# Patient Record
Sex: MALE | Race: WHITE | NOT HISPANIC OR LATINO | Employment: OTHER | URBAN - METROPOLITAN AREA
[De-identification: names, ages, dates, MRNs, and addresses within clinical notes are randomized per-mention and may not be internally consistent; named-entity substitution may affect disease eponyms.]

---

## 2023-10-09 PROBLEM — M77.8 RIGHT SHOULDER TENDINITIS: Status: ACTIVE | Noted: 2023-10-09

## 2023-10-09 PROBLEM — M54.12 RIGHT CERVICAL RADICULOPATHY: Status: ACTIVE | Noted: 2023-10-09

## 2023-10-09 PROBLEM — G89.29 CHRONIC BILATERAL LOW BACK PAIN WITHOUT SCIATICA: Status: ACTIVE | Noted: 2023-10-09

## 2023-10-09 PROBLEM — M54.50 CHRONIC BILATERAL LOW BACK PAIN WITHOUT SCIATICA: Status: ACTIVE | Noted: 2023-10-09

## 2023-10-09 PROBLEM — G56.03 CARPAL TUNNEL SYNDROME ON BOTH SIDES: Status: ACTIVE | Noted: 2023-10-09

## 2023-10-09 PROBLEM — S83.242A OTH TEAR OF MEDIAL MENISCUS, CURRENT INJURY, LEFT KNEE, INIT: Status: ACTIVE | Noted: 2023-10-09

## 2023-10-09 PROBLEM — M54.12 LEFT CERVICAL RADICULOPATHY: Status: ACTIVE | Noted: 2023-10-09

## 2023-10-09 PROBLEM — M17.12 LOCALIZED OSTEOARTHRITIS OF LEFT KNEE: Status: ACTIVE | Noted: 2023-10-09

## 2023-10-09 PROBLEM — M50.20 DISC DISPLACEMENT, CERVICAL: Status: ACTIVE | Noted: 2023-10-09

## 2023-10-09 RX ORDER — SUMATRIPTAN SUCCINATE 100 MG/1
100 TABLET ORAL ONCE AS NEEDED
COMMUNITY
Start: 2016-02-09

## 2023-10-09 RX ORDER — TIZANIDINE 4 MG/1
1 TABLET ORAL 2 TIMES DAILY
COMMUNITY
Start: 2019-10-02 | End: 2024-01-18 | Stop reason: SDUPTHER

## 2023-10-10 ENCOUNTER — PROCEDURE VISIT (OUTPATIENT)
Dept: PAIN MEDICINE | Facility: CLINIC | Age: 63
End: 2023-10-10
Payer: COMMERCIAL

## 2023-10-10 DIAGNOSIS — G89.29 CHRONIC BILATERAL LOW BACK PAIN WITHOUT SCIATICA: Primary | ICD-10-CM

## 2023-10-10 DIAGNOSIS — M54.50 CHRONIC BILATERAL LOW BACK PAIN WITHOUT SCIATICA: Primary | ICD-10-CM

## 2023-10-10 PROCEDURE — 97814 ACUP 1/> W/ESTIM EA ADDL 15: CPT | Performed by: PHYSICAL MEDICINE & REHABILITATION

## 2023-10-10 PROCEDURE — 97813 ACUP 1/> W/ESTIM 1ST 15 MIN: CPT | Performed by: PHYSICAL MEDICINE & REHABILITATION

## 2023-10-10 NOTE — PROGRESS NOTES
Chief complaint  Lower back pain    History  Mr. Wilson is back today for his acupuncture.  He had 1 treatment session and could not follow-up this is for his lower back pain  Per his insurance he is allowed 12 visits within 90 days/ No pa required  For chronic LBP  He has a low back pain across the lower back area deep achy stabbing.  The pain is worse with forward flexion and bending.  He had x-rays and showed multiple level degenerative changes but there is no compressive pathology.  There is no overt radicular symptoms to the lower limbs.    Physical examination  declined Chaperone for the visit and was adequately  draped for the exam.  He had tight muscle bands in the lower back area.  Of note that he also have neck pain associated with limited range of motion as well    Diagnosis  Lumbar degenerative disc disease chronic low back pain    Plan  I went over the mechanism of action of the acupuncture and the process of releasing endorphins to help with the pain in the lower back.  He had tried other modalities with physical therapy and home exercise program.  He had a TENS unit in the past and those did not help.  He wanted to proceed again today with acupuncture.  He had prior treatment that he had some relief he wanted to continue.    We discussed the risks and benefits of the injections including but not limited to nerve injury, infection, bleeding, headaches and paralysis and remotely death. The patient agreed to proceed, will perform the procedure with sterile techniques. Declined Chaperone for the procedure.     Placed in prone position.  I started with Michael Men treatment with 8 Hz.  That was kept for the rest of the treatment but increased to 10 Hz after 15 minutes  At 15 minutes I added bladder kidney circulation from BL 40 to Bl 57 balanced by Ki 3 bilaterally this was done at 6 Hz and later increased to 8 Hz  At 30 minutes I agitans module and two needles  technique to the lower back area and iron  those at 6 hz    At 45 minutes I removed all needles from top to bottom  Treatment started at 1:40 PM and concluded at 2:30 PM next procedure clinical very well no complication encounter  Avoid heavy exercise and heavy meals.    Avoid any other intense activity for the next 24 hours.    You might experience transient aggravation of the symptoms being treated with acupuncture resulting from the treatment today.  This will only be transient for few hours but should improve.    You may experience tiredness and fatigability after the acupuncture treatment today which may last up to 24 hours.    You should start feeling improvement of the symptoms treated for within the next several hours or by the next 2 or 3 days.    We may need to repeat therapy with acupuncture to improve the pain control

## 2023-10-14 PROBLEM — M51.369 LUMBAR DEGENERATIVE DISC DISEASE: Status: ACTIVE | Noted: 2023-10-14

## 2023-10-14 PROBLEM — M51.36 LUMBAR DEGENERATIVE DISC DISEASE: Status: ACTIVE | Noted: 2023-10-14

## 2023-10-14 NOTE — PATIENT INSTRUCTIONS
Avoid heavy exercise and heavy meals.    Avoid any other intense activity for the next 24 hours.    You might experience transient aggravation of the symptoms being treated with acupuncture resulting from the treatment today.  This will only be transient for few hours but should improve.    You may experience tiredness and fatigability after the acupuncture treatment today which may last up to 24 hours.    You should start feeling improvement of the symptoms treated for within the next several hours or by the next 2 or 3 days.    We may need to repeat therapy with acupuncture to improve the pain control

## 2023-10-19 ENCOUNTER — PROCEDURE VISIT (OUTPATIENT)
Dept: PAIN MEDICINE | Facility: CLINIC | Age: 63
End: 2023-10-19
Payer: COMMERCIAL

## 2023-10-19 DIAGNOSIS — G89.29 CHRONIC BILATERAL LOW BACK PAIN WITHOUT SCIATICA: Primary | ICD-10-CM

## 2023-10-19 DIAGNOSIS — M54.50 CHRONIC BILATERAL LOW BACK PAIN WITHOUT SCIATICA: Primary | ICD-10-CM

## 2023-10-19 PROCEDURE — 97814 ACUP 1/> W/ESTIM EA ADDL 15: CPT | Performed by: PHYSICAL MEDICINE & REHABILITATION

## 2023-10-19 PROCEDURE — 97813 ACUP 1/> W/ESTIM 1ST 15 MIN: CPT | Performed by: PHYSICAL MEDICINE & REHABILITATION

## 2023-10-19 NOTE — PROGRESS NOTES
Chief complaint  Lower back pain presenting for acupuncture     History  Mr. Wilson is back today for his acupuncture.    this is for his lower back pain , this is the 3 treatment . He is allowed per insurance to have 12 visits within 90 days/ No pa required  For chronic LBP  He starting to get more limber with the acupuncture and feeling relief   He has a low back pain across the lower back area deep achy stabbing.  The pain is worse with forward flexion and bending.  He had x-rays and showed multiple level degenerative changes but there is no compressive pathology.  There is no overt radicular symptoms to the lower limbs.     Physical examination  declined Chaperone for the visit and was adequately  draped for the exam.  He had tight muscle bands in the lower back area.  Of note that he also have neck pain associated with limited range of motion as well     Diagnosis  Lumbar degenerative disc disease chronic low back pain     Plan  I went over the mechanism of action of the acupuncture and the process of releasing endorphins to help with the pain in the lower back.  He had tried other modalities with physical therapy and home exercise program.  He had a TENS unit in the past and those did not help.  He wanted to proceed again today with acupuncture.  He had prior treatment that he had some relief he wanted to continue.     We discussed the risks and benefits of the injections including but not limited to nerve injury, infection, bleeding, headaches and paralysis and remotely death. The patient agreed to proceed, will perform the procedure with sterile techniques. Declined Chaperone for the procedure.     Placed in prone position.  I started with Michael Men treatment with 6 Hz.  That was kept for the rest of the treatment but increased to 8 Hz after 15 minutes  At 15 minutes I added bladder kidney circulation from BL 40 to Bl 57 balanced by Ki 3 bilaterally this was done at 6 Hz and later increased to 8 Hz  At 30  minutes I added a PENS module and two needles  technique to the lower back area and iron those at 6 hz  I also added Li4 to help with anxiety    I placed auricular points for the lower L spine for the duration of the treatment     At 45 minutes I removed all needles from top to bottom  Treatment started at 1:40 PM and concluded at 2:30 PM next procedure clinical very well no complication encountered

## 2023-11-02 ENCOUNTER — APPOINTMENT (OUTPATIENT)
Dept: PAIN MEDICINE | Facility: CLINIC | Age: 63
End: 2023-11-02
Payer: COMMERCIAL

## 2023-11-09 ENCOUNTER — PROCEDURE VISIT (OUTPATIENT)
Dept: PAIN MEDICINE | Facility: CLINIC | Age: 63
End: 2023-11-09
Payer: COMMERCIAL

## 2023-11-09 DIAGNOSIS — G89.29 CHRONIC BILATERAL LOW BACK PAIN WITHOUT SCIATICA: Primary | ICD-10-CM

## 2023-11-09 DIAGNOSIS — M54.50 CHRONIC BILATERAL LOW BACK PAIN WITHOUT SCIATICA: Primary | ICD-10-CM

## 2023-11-09 DIAGNOSIS — M51.36 LUMBAR DEGENERATIVE DISC DISEASE: ICD-10-CM

## 2023-11-09 PROCEDURE — 97813 ACUP 1/> W/ESTIM 1ST 15 MIN: CPT | Performed by: PHYSICAL MEDICINE & REHABILITATION

## 2023-11-09 PROCEDURE — 97814 ACUP 1/> W/ESTIM EA ADDL 15: CPT | Performed by: PHYSICAL MEDICINE & REHABILITATION

## 2023-11-09 NOTE — PROGRESS NOTES
Chief complaint  Lower back pain    History  Presenting for acupuncture    this is for his lower back pain , this is the 4tgh treatment . He is allowed per insurance to have 12 visits within 90 days/ No pa required  For chronic LBP     Diagnosis  Problem List Items Addressed This Visit       Chronic bilateral low back pain without sciatica - Primary    Lumbar degenerative disc disease         Plan  I went over the mechanism of action of the acupuncture and the process of releasing endorphins to help with the pain in the lower back.  He had tried other modalities with physical therapy and home exercise program.           Placed in prone position.  I started with Michael Men treatment with 6 Hz.  That was kept for the rest of the treatment but increased to 8 Hz after 15 minutes  At 15 minutes I added bladder kidney circulation from BL 40 to Bl 57 balanced by Ki 3 bilaterally this was done at 6 Hz and later increased to 6 Hz  At 30 minutes I added a PENS module and two needles  technique to the lower back area and iron those at 4 hz  I also added Li4 to help with anxiety     I placed auricular points for the lower L spine for the duration of the treatment     At 45 minutes I removed all needles from top to bottom  Treatment started at 10 AM and concluded at 10:50 AM next procedure clinical very well no complications encountered     Follow-up next week for the next treatment

## 2023-11-16 ENCOUNTER — APPOINTMENT (OUTPATIENT)
Dept: PAIN MEDICINE | Facility: CLINIC | Age: 63
End: 2023-11-16
Payer: COMMERCIAL

## 2024-01-18 ENCOUNTER — OFFICE VISIT (OUTPATIENT)
Dept: PAIN MEDICINE | Facility: CLINIC | Age: 64
End: 2024-01-18
Payer: COMMERCIAL

## 2024-01-18 DIAGNOSIS — M54.50 CHRONIC BILATERAL LOW BACK PAIN WITHOUT SCIATICA: Primary | ICD-10-CM

## 2024-01-18 DIAGNOSIS — G89.29 CHRONIC BILATERAL LOW BACK PAIN WITHOUT SCIATICA: Primary | ICD-10-CM

## 2024-01-18 DIAGNOSIS — M51.36 LUMBAR DEGENERATIVE DISC DISEASE: ICD-10-CM

## 2024-01-18 DIAGNOSIS — M50.20 DISC DISPLACEMENT, CERVICAL: ICD-10-CM

## 2024-01-18 PROCEDURE — 99214 OFFICE O/P EST MOD 30 MIN: CPT | Performed by: PHYSICAL MEDICINE & REHABILITATION

## 2024-01-18 RX ORDER — TIZANIDINE 4 MG/1
4 TABLET ORAL 2 TIMES DAILY
Qty: 60 TABLET | Refills: 1 | Status: SHIPPED | OUTPATIENT
Start: 2024-01-18 | End: 2024-04-18 | Stop reason: SDUPTHER

## 2024-01-18 NOTE — PROGRESS NOTES
Chief complaint  Back and neck pain    History  Mr Wilson is back for a visit  Acup helped some  Continue with back pain and neck pain   The pain in the back is deep achy worse in the mid back area.  This is associated with tight muscle bands.  This limits the range of motion of the lumbar spine mainly in forward flexion.  The pain in the back is radiating around the side on the lateral aspect of the   thighs going down toward the lateral aspect of the legs into the lateral malleosli toward the lateral aspect of the feet towards the big toes.       The neck pain is of similar caracteristics     Pain level without medication is 8/10 , with the medication pain level 6/10.     The pain meds are helping control the pain and improving Activities of Daily living and quality of life and quality of sleep.       PDI (Pain Disability Index) score: 74  Oarrs pulled and scanned in the chart  no concerns     Xray updated spine   ORT Score is  0  Pain pathology and pain generators spine   Modalities tried injection, surgery, physical therapy, TENS unit, nonsteroidal anti-inflammatory medication  acup injection      Denied any fever or chills. No weight loss and no night sweats. No cough or sputum production. No diarrhea   The constipation has been responding to fibers and over the counter medications.     No bladder and bowel incontinence and no other changes in bladder and bowel. No skin changes.  Reports tiredness and fatigability only if the pain is not controlled.   Denied opioids diversion and abuse and denies alcoholism. Denies overuse of the pain medications.    The control of the pain with the pain medications is helping the control of the symptoms and allowing the function and activities of daily living, enjoyment of life, improving the quality of life and sleep with less interruption by the pain. The goal is symptomatic control of the nonmalignant chronic pain and not to repair the permanent damage in the tissues  inducing the chronic pain conditions. We are aiming to shift the focus from the nonmalignant chronic pain to other aspects of life by symptomatically treating this chronic pain. If this pain is not treated it will lead to major morbidity and it is also associated with increased risks of mortality. The patient understands those very clearly and also understand high risks of morbidity and mortality if not strictly adherent to the treatment recommendations and reporting any associated side effects. Also patient understand the full responsibility associated with these medications to avoid abuse or overuse or any use of these medications for anything besides treating the patient's own chronic pain and nothing else under any circumstances.        Physical examination  Awake, alert and oriented for time place and persons   declined Chaperone for the visit and was adequately  draped for the exam.  Tight ROM of spine with tight muscle bands    Diagnosis  Problem List Items Addressed This Visit       Disc displacement, cervical    Relevant Medications    tiZANidine (Zanaflex) 4 mg tablet    Chronic bilateral low back pain without sciatica - Primary    Relevant Medications    tiZANidine (Zanaflex) 4 mg tablet    Lumbar degenerative disc disease    Relevant Medications    tiZANidine (Zanaflex) 4 mg tablet        Plan  Reviewed the pain generators.  Went over the types of pain with neuropathic and nociceptive and different pathologies and therapeutic modalities. Discussed the mechanism of action of interventions from acupuncture, physical therapy , regular exercises, injections, botox, spinal cord stimulation, and role of surgery     Went over pathology of the intervertebral disc displacement and the anatomical relation to the Nerve roots and relation to the radicular symptoms. Went over treatment modalities with conservative treatment including acupuncture   and epidural steroid injection with fluoroscopy guidance and last  resort of surgery       Discussed about NSAIDS and I explained about the opioids sparing effect to allow keeping the opioids dose at minimal effective dose.   I went over the potential side effects of the NSAIDS on the gastrointestinal, renal and cardiovascular systems.        Tizanidine 4 mg bid    Follow-up 8 weeks or earlier if needed     The level of clinical decision making in this office visit,  is high, given the high risks of complications with the morbidity and mortality due to the fact that acute and chronic pain may pose a threat to life and bodily function, if under treated, poorly treated, or with failure to maintain adequate treatment and timely medical follow up. Additionally over treatment has its own set of complications including overdosing on the pain medications and also the habit forming potentials with the use of the medications used to treat chronic painful conditions including therapeutic classes classified as dangerous medications. Given the serious and fluctuating nature of pain level and instensity with extensive consideration for whenever pain changes, there is always the risk of prolonged functional impairment requiring close patient monitoring with regular assessments and reassessments and high level medical decision making at every office visit. The amount and complexity of data reviewed is high given the patient clinical presentation, labs,  data, radiology reports, and other tests as discussed during office visits. Pertinent data whether positive or negative were taken in consideration in the process of making this high level medical decision.

## 2024-04-11 ENCOUNTER — APPOINTMENT (OUTPATIENT)
Dept: PAIN MEDICINE | Facility: CLINIC | Age: 64
End: 2024-04-11
Payer: COMMERCIAL

## 2024-04-18 ENCOUNTER — OFFICE VISIT (OUTPATIENT)
Dept: PAIN MEDICINE | Facility: CLINIC | Age: 64
End: 2024-04-18
Payer: COMMERCIAL

## 2024-04-18 DIAGNOSIS — M17.12 LOCALIZED OSTEOARTHRITIS OF LEFT KNEE: ICD-10-CM

## 2024-04-18 DIAGNOSIS — M54.50 CHRONIC BILATERAL LOW BACK PAIN WITHOUT SCIATICA: ICD-10-CM

## 2024-04-18 DIAGNOSIS — M54.12 LEFT CERVICAL RADICULOPATHY: ICD-10-CM

## 2024-04-18 DIAGNOSIS — M51.36 LUMBAR DEGENERATIVE DISC DISEASE: Primary | ICD-10-CM

## 2024-04-18 DIAGNOSIS — M50.20 DISC DISPLACEMENT, CERVICAL: ICD-10-CM

## 2024-04-18 DIAGNOSIS — G89.29 CHRONIC BILATERAL LOW BACK PAIN WITHOUT SCIATICA: ICD-10-CM

## 2024-04-18 PROCEDURE — 99214 OFFICE O/P EST MOD 30 MIN: CPT | Performed by: PHYSICAL MEDICINE & REHABILITATION

## 2024-04-18 RX ORDER — TIZANIDINE 4 MG/1
4 TABLET ORAL 2 TIMES DAILY
Qty: 60 TABLET | Refills: 2 | Status: SHIPPED | OUTPATIENT
Start: 2024-04-18 | End: 2024-05-18

## 2024-04-18 NOTE — PROGRESS NOTES
Chief complaint  Neck and left upper limb  Back pain   Left knee pain     History  Dax Wilson is back for pain management office visit  The neck and shoulder are better  Back pain on and off  Worst pain is in the left knee  The pain in the left  knee is deep achy stabbing.  It is both on the medial and lateral aspects and behind the kneecap.  The knee pain is associated with sensation of crunching upon range of motion and weightbearing.  The pain is continuous at rest associated with stiffness with prolonged sitting and get worse with standing walking or any weightbearing activity.  Occasionally there is knee swelling.  No change in color or texture of the skin.  No pain proximal to the thigh or distal to the leg associated with the knee pain.  With episodes of aggravation of the pain there are occasion of sensation of instability but no falls.       Pain level at rest    is 3/10 , with the aggravation     pain level 5/10.     Review of Systems     Denied any fever or chills. No weight loss and no night sweats. No cough or sputum production. No diarrhea   The constipation has been responding to fibers and over the counter medications.     No bladder and bowel incontinence and no other changes in bladder and bowel. No skin changes.  Reports tiredness and fatigability only if the pain is not controlled.   Denied opioids diversion and abuse and denies alcoholism. Denies overuse of the pain medications.         Physical examination  Awake, alert and oriented for time place and persons   declined Chaperone for the visit and was adequately  draped for the exam.    Examination of the left knee showed mild clinical effusion. Normal skin color and texture palpation of the knee showed tenderness over the medial and lateral joint line and the sensation of crepitation upon range of motion.  Range of motion from -2 degrees to 105 degrees. No medial lateral instability. No drawer sign.  Lachman is negative.  Positive  Floresita both medially and laterally.  Negative pivot shift.  Homans' sign is negative.  Calves are soft.  Knee flexion and extension is 4/5 and limited by the pain.     Diagnosis  Problem List Items Addressed This Visit       Disc displacement, cervical    Relevant Medications    tiZANidine (Zanaflex) 4 mg tablet    Left cervical radiculopathy    Localized osteoarthritis of left knee    Chronic bilateral low back pain without sciatica    Relevant Medications    tiZANidine (Zanaflex) 4 mg tablet    Lumbar degenerative disc disease - Primary    Relevant Medications    tiZANidine (Zanaflex) 4 mg tablet        Plan  Reviewed the pain generators.  Went over the types of pain with neuropathic and nociceptive and different pathologies and therapeutic modalities. Discussed the mechanism of action of interventions from acupuncture, physical therapy , regular exercises, injections, botox, spinal cord stimulation, and role of surgery     Went over pathology of the intervertebral disc displacement and the anatomical relation to the Nerve roots and relation to the radicular symptoms. Went over treatment modalities with conservative treatment including acupuncture   and epidural steroid injection with fluoroscopy guidance and last resort of surgery    Dw him about left knee and cartilages.   Also dw him about HEP   Consider intraarticular steroid injection        Discussed about NSAIDS and I explained about the opioids sparing effect to allow keeping the opioids dose at minimal effective dose.   I went over the potential side effects of the NSAIDS on the gastrointestinal, renal and cardiovascular systems.      I detailed the side effects from the acetaminophen in the medication and made aware of those. I also explained about the cumulative effects on the organs and mainly the liver.      Continue with tizanidine 4 mg bid fornow.    Follow-up 3 months or earlier if needed     The level of clinical decision making in this office  visit,  is high, given the high risks of complications with the morbidity and mortality due to the fact that acute and chronic pain may pose a threat to life and bodily function, if under treated, poorly treated, or with failure to maintain adequate treatment and timely medical follow up. Additionally over treatment has its own set of complications including overdosing on the pain medications and also the habit forming potentials with the use of the medications used to treat chronic painful conditions including therapeutic classes classified as dangerous medications. Given the serious and fluctuating nature of pain level and instensity with extensive consideration for whenever pain changes, there is always the risk of prolonged functional impairment requiring close patient monitoring with regular assessments and reassessments and high level medical decision making at every office visit. The amount and complexity of data reviewed is high given the patient clinical presentation, labs,  data, radiology reports, and other tests as discussed during office visits. Pertinent data whether positive or negative were taken in consideration in the process of making this high level medical decision.

## 2024-07-11 ENCOUNTER — APPOINTMENT (OUTPATIENT)
Dept: PAIN MEDICINE | Facility: CLINIC | Age: 64
End: 2024-07-11
Payer: COMMERCIAL

## 2024-07-11 DIAGNOSIS — M54.12 RIGHT CERVICAL RADICULOPATHY: ICD-10-CM

## 2024-07-11 DIAGNOSIS — M51.36 LUMBAR DEGENERATIVE DISC DISEASE: ICD-10-CM

## 2024-07-11 DIAGNOSIS — M54.50 CHRONIC BILATERAL LOW BACK PAIN WITHOUT SCIATICA: ICD-10-CM

## 2024-07-11 DIAGNOSIS — M50.20 DISC DISPLACEMENT, CERVICAL: Primary | ICD-10-CM

## 2024-07-11 DIAGNOSIS — G89.29 CHRONIC BILATERAL LOW BACK PAIN WITHOUT SCIATICA: ICD-10-CM

## 2024-07-11 PROCEDURE — 99214 OFFICE O/P EST MOD 30 MIN: CPT | Performed by: PHYSICAL MEDICINE & REHABILITATION

## 2024-07-11 RX ORDER — TIZANIDINE 4 MG/1
4 TABLET ORAL 2 TIMES DAILY
Qty: 60 TABLET | Refills: 2 | Status: SHIPPED | OUTPATIENT
Start: 2024-07-11 | End: 2024-08-10

## 2024-07-11 NOTE — PROGRESS NOTES
"Chief complaint  Neck and back pain     History  Dax Wilson is back for pain management office visit  Continue with neck pain and radiating to the upper limbs  Not considering surgery  The pain at the base of the neck is associated with deep stabbing sensation along with tight muscles limiting the range of motion of the neck mainly in flexion and sidebending.  This is associated with burning sensation going down the radial aspect of the left upper limb reaching the elbow, forearm, radial aspect of the left wrist into the first digital space along with the thumb and index finger.  The pain worsens with reaching overhead or with bending with the neck forward and to the side.  Rotation of the neck is limited by the tight muscles at the base of the neck and also by increased pain to the left upper limb.  This is associated with weakness with the left elbow flexion along with weakened  and decreased manual dexterity with the left  hand.  Occasionally dropping objects if not careful with handling using the left hand.  No reported difficulty with the gait and no trouble with bowel or bladder continence.     He is postponing surgery bc of the extensive surgery    Also back pain       Pain level without medication is 8/10 , with the medication pain level 3 to 4/10.        Review of Systems :  Denied any fever or chills. No weight loss and no night sweats. No cough or sputum production. No diarrhea   The constipation has been responding to fibers and over the counter medications.     No bladder and bowel incontinence and no other changes in bladder and bowel. No skin changes.  Reports tiredness and fatigability only if the pain is not controlled.   Denied opioids diversion and abuse and denies alcoholism. Denies overuse of the pain medications.  No reported euphoria sensation or getting a \"high\" on the pain medications.      The pain is interfering with activities of daily living, quality of life and quality of " sleep. It is limiting the functions and everything takes longer to complete because of the slowing related to the pain. Movements are cautious to avoid aggravation of the symptoms.       Physical examination  Awake, alert and oriented for time place and persons   declined Chaperone for the visit and was adequately  draped for the exam.      Examination of the cervical spine showed tight muscle bands on the left side limiting the range of motion of the cervical spine in flexion and bending to the left side because that increases the pain.  Spurling's maneuver increased the pain at the base of the neck and down the left upper limb on the radial aspect of the arm reaching above the elbow, forearm and the first interdigital space including the thumb and index fingers. Similar findings with cervical root tension sign on the left side with the pain reaching down to the thumb and index finger.   Decreased sensory to light touch on the radial aspect of the left forearm with the first interdigital webspace as well as the thumb and index finger.  Deep tendon reflexes showed decreased biceps and brachioradialis reflex.  The  triceps reflexes is  present.  Elbow flexion and supination are 4/5 on the left compared to 5/5 on the right.  The left  is slightly weaker compared to the left .  Tinel's sign over the median nerve at the wrist and ulnar nerve at the elbow are both negative.    No increased tendon reflexes in the lower limbs plantar cutaneous are downgoing bilaterally.        No long tract signs of hte lower limbs     Diagnosis  Problem List Items Addressed This Visit       Disc displacement, cervical - Primary    Relevant Medications    tiZANidine (Zanaflex) 4 mg tablet    Right cervical radiculopathy    Chronic bilateral low back pain without sciatica    Relevant Medications    tiZANidine (Zanaflex) 4 mg tablet    Lumbar degenerative disc disease    Relevant Medications    tiZANidine (Zanaflex) 4 mg tablet         Plan  Reviewed the pain generators.  Went over the types of pain with neuropathic and nociceptive and different pathologies and therapeutic modalities. Discussed the mechanism of action of interventions from acupuncture, physical therapy , regular exercises, injections, botox, spinal cord stimulation, and role of surgery     Went over pathology of the intervertebral disc displacement and the anatomical relation to the Nerve roots and relation to the radicular symptoms. Went over treatment modalities with conservative treatment including acupuncture   and epidural steroid injection with fluoroscopy guidance and last resort of surgery        Discussed about NSAIDS and I explained about the opioids sparing effect to allow keeping the opioids dose at minimal effective dose.   I went over the potential side effects of the NSAIDS on the gastrointestinal, renal and cardiovascular systems.      I detailed the side effects from the acetaminophen in the medication and made aware of those. I also explained about the cumulative effects on the organs and mainly the liver.     Tizanidine  Consult with surgeon if he has myelopathic symptoms as we have dw him including difficulties walking and incontience or severe pain in the upper or lower limbs.    Follow-up 3 months or earlier if needed     The level of clinical decision making in this office visit,  is high, given the high risks of complications with the morbidity and mortality due to the fact that acute and chronic pain may pose a threat to life and bodily function, if under treated, poorly treated, or with failure to maintain adequate treatment and timely medical follow up. Additionally over treatment has its own set of complications including overdosing on the pain medications and also the habit forming potentials with the use of the medications used to treat chronic painful conditions including therapeutic classes classified as dangerous medications. Given the serious  and fluctuating nature of pain level and instensity with extensive consideration for whenever pain changes, there is always the risk of prolonged functional impairment requiring close patient monitoring with regular assessments and reassessments and high level medical decision making at every office visit. The amount and complexity of data reviewed is high given the patient clinical presentation, labs,  data, radiology reports, and other tests as discussed during office visits. Pertinent data whether positive or negative were taken in consideration in the process of making this high level medical decision.

## 2024-10-03 ENCOUNTER — APPOINTMENT (OUTPATIENT)
Dept: PAIN MEDICINE | Facility: CLINIC | Age: 64
End: 2024-10-03
Payer: COMMERCIAL

## 2024-10-07 ENCOUNTER — APPOINTMENT (OUTPATIENT)
Dept: PAIN MEDICINE | Facility: CLINIC | Age: 64
End: 2024-10-07
Payer: COMMERCIAL

## 2024-10-07 DIAGNOSIS — M54.50 CHRONIC BILATERAL LOW BACK PAIN WITHOUT SCIATICA: ICD-10-CM

## 2024-10-07 DIAGNOSIS — M50.20 DISC DISPLACEMENT, CERVICAL: ICD-10-CM

## 2024-10-07 DIAGNOSIS — G89.29 CHRONIC BILATERAL LOW BACK PAIN WITHOUT SCIATICA: ICD-10-CM

## 2024-10-07 DIAGNOSIS — M51.369 LUMBAR DEGENERATIVE DISC DISEASE: ICD-10-CM

## 2024-10-07 PROCEDURE — 99214 OFFICE O/P EST MOD 30 MIN: CPT | Performed by: PHYSICAL MEDICINE & REHABILITATION

## 2024-10-07 RX ORDER — TIZANIDINE 4 MG/1
4 TABLET ORAL 2 TIMES DAILY
Qty: 60 TABLET | Refills: 2 | Status: SHIPPED | OUTPATIENT
Start: 2024-10-07 | End: 2024-11-06

## 2024-10-07 NOTE — PROGRESS NOTES
Chief complaint  Neck and RAGHU pain     History  Dax Wilson is back for pain management office visit  Continues with pain in the back of neck and intermittent radiation to the RAGHU  The pain at the base of the neck is associated with deep stabbing sensation along with tight muscles limiting the range of motion of the neck mainly in flexion and sidebending.  This is associated with burning sensation going down the back of the left upper limb reaching the elbow and the back of the left  forearm to the  wrist and middle finger. The pain worsens with reaching overhead and with bending the neck forward and to the side.  Rotation of the neck is limited by the tight muscles at the base of the neck and also by increased pain to the left upper limb.  This is associated with weakness with the left elbow extension and with wrist extenstion and affecting the  in that the  has been weaker since the appearance of this pain.  There is decreased manual dexterity of the left hand.  Occasionally dropping objects from the hand if not careful handling objects.  No reported difficulty with the gait and no trouble with bowel or bladder continence.     Pain level without medication is 8/10 , with the medication pain level 3 to 4 /10.        Review of Systems :  Denied any fever or chills. No weight loss and no night sweats. No cough or sputum production. No diarrhea   The constipation has been responding to fibers and over the counter medications.     No bladder and bowel incontinence and no other changes in bladder and bowel. No skin changes.  Reports tiredness and fatigability only if the pain is not controlled.            Physical examination  Awake, alert and oriented for time place and persons   declined Chaperone for the visit and was adequately  draped for the exam.      The pain at the base of the neck is associated with deep stabbing sensation along with tight muscles limiting the range of motion of the neck mainly in  flexion and sidebending.  This is associated with burning sensation going down the back of the left upper limb reaching the elbow and the back of the left  forearm to the  wrist and middle finger. The pain worsens with reaching overhead and with bending the neck forward and to the side.  Rotation of the neck is limited by the tight muscles at the base of the neck and also by increased pain to the left upper limb.  This is associated with weakness with the left elbow extension and with wrist extenstion and affecting the  in that the  has been weaker since the appearance of this pain.  There is decreased manual dexterity of the left hand.  Occasionally dropping objects from the hand if not careful handling objects.  No reported difficulty with the gait and no trouble with bowel or bladder continence.  plantar cutaneous reflex are down going bilaterally       Diagnosis  Problem List Items Addressed This Visit       Disc displacement, cervical    Relevant Medications    tiZANidine (Zanaflex) 4 mg tablet    Chronic bilateral low back pain without sciatica    Relevant Medications    tiZANidine (Zanaflex) 4 mg tablet    Lumbar degenerative disc disease    Relevant Medications    tiZANidine (Zanaflex) 4 mg tablet        Plan  Reviewed the pain generators.  Went over the types of pain with neuropathic and nociceptive and different pathologies and therapeutic modalities. Discussed the mechanism of action of interventions from acupuncture, physical therapy , regular exercises, injections, botox, spinal cord stimulation, and role of surgery     Went over pathology of the intervertebral disc displacement and the anatomical relation to the Nerve roots and relation to the radicular symptoms. Went over treatment modalities with conservative treatment including acupuncture   and epidural steroid injection with fluoroscopy guidance and last resort of surgery       We discussed that we are prescribing the medications on good  kimberly and legitimate medical reason.        realizes the interaction between the therapeutic classes including the respiratory depression and potential death     Random drug testing   we will submit     BERTHA did not help  Saw surgeon but did not want to have surgery at this time.  realizes the interaction between the therapeutic classes including the respiratory depression and potential death     Discussed about NSAIDS and I explained about the opioids sparing effect to allow keeping the opioids dose at minimal effective dose.   I went over the potential side effects of the NSAIDS on the gastrointestinal, renal and cardiovascular systems.      I detailed the side effects from the acetaminophen in the medication and made aware of those. I also explained about the cumulative effects on the organs and mainly the liver.        Follow-up 3 months or earlier if needed     The level of clinical decision making in this office visit,  is high, given the high risks of complications with the morbidity and mortality due to the fact that acute and chronic pain may pose a threat to life and bodily function, if under treated, poorly treated, or with failure to maintain adequate treatment and timely medical follow up. Additionally over treatment has its own set of complications including overdosing on the pain medications and also the habit forming potentials with the use of the medications used to treat chronic painful conditions including therapeutic classes classified as dangerous medications. Given the serious and fluctuating nature of pain level and instensity with extensive consideration for whenever pain changes, there is always the risk of prolonged functional impairment requiring close patient monitoring with regular assessments and reassessments and high level medical decision making at every office visit. The amount and complexity of data reviewed is high given the patient clinical presentation, labs,  data, radiology  reports, and other tests as discussed during office visits. Pertinent data whether positive or negative were taken in consideration in the process of making this high level medical decision.

## 2024-12-30 ENCOUNTER — DOCUMENTATION (OUTPATIENT)
Dept: OPERATING ROOM | Facility: CLINIC | Age: 64
End: 2024-12-30
Payer: COMMERCIAL

## 2024-12-30 NOTE — PROGRESS NOTES
To whom it may concern:    Mr Wilson have chronic neck condition that limits his ability to sit for more than 20 minutes at a time. He has to stand and move to avoid aggravation of the pain.     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

## 2025-01-09 ENCOUNTER — APPOINTMENT (OUTPATIENT)
Dept: PAIN MEDICINE | Facility: CLINIC | Age: 65
End: 2025-01-09
Payer: COMMERCIAL

## 2025-01-09 DIAGNOSIS — M54.50 CHRONIC BILATERAL LOW BACK PAIN WITHOUT SCIATICA: ICD-10-CM

## 2025-01-09 DIAGNOSIS — M50.20 DISC DISPLACEMENT, CERVICAL: Primary | ICD-10-CM

## 2025-01-09 DIAGNOSIS — M51.369 LUMBAR DEGENERATIVE DISC DISEASE: ICD-10-CM

## 2025-01-09 DIAGNOSIS — M54.12 RIGHT CERVICAL RADICULOPATHY: ICD-10-CM

## 2025-01-09 DIAGNOSIS — G89.29 CHRONIC BILATERAL LOW BACK PAIN WITHOUT SCIATICA: ICD-10-CM

## 2025-01-09 DIAGNOSIS — M54.12 LEFT CERVICAL RADICULOPATHY: ICD-10-CM

## 2025-01-09 PROCEDURE — G2211 COMPLEX E/M VISIT ADD ON: HCPCS | Performed by: PHYSICAL MEDICINE & REHABILITATION

## 2025-01-09 PROCEDURE — 99214 OFFICE O/P EST MOD 30 MIN: CPT | Performed by: PHYSICAL MEDICINE & REHABILITATION

## 2025-01-09 PROCEDURE — 1036F TOBACCO NON-USER: CPT | Performed by: PHYSICAL MEDICINE & REHABILITATION

## 2025-01-09 RX ORDER — TIZANIDINE 4 MG/1
4 TABLET ORAL 2 TIMES DAILY
Qty: 60 TABLET | Refills: 2 | Status: SHIPPED | OUTPATIENT
Start: 2025-01-09 | End: 2025-02-08

## 2025-01-09 ASSESSMENT — ANXIETY QUESTIONNAIRES
5. BEING SO RESTLESS THAT IT IS HARD TO SIT STILL: NOT AT ALL
6. BECOMING EASILY ANNOYED OR IRRITABLE: NOT AT ALL
1. FEELING NERVOUS, ANXIOUS, OR ON EDGE: NOT AT ALL
2. NOT BEING ABLE TO STOP OR CONTROL WORRYING: NOT AT ALL
GAD7 TOTAL SCORE: 0
4. TROUBLE RELAXING: NOT AT ALL
3. WORRYING TOO MUCH ABOUT DIFFERENT THINGS: NOT AT ALL
IF YOU CHECKED OFF ANY PROBLEMS ON THIS QUESTIONNAIRE, HOW DIFFICULT HAVE THESE PROBLEMS MADE IT FOR YOU TO DO YOUR WORK, TAKE CARE OF THINGS AT HOME, OR GET ALONG WITH OTHER PEOPLE: NOT DIFFICULT AT ALL
7. FEELING AFRAID AS IF SOMETHING AWFUL MIGHT HAPPEN: NOT AT ALL

## 2025-01-09 ASSESSMENT — COLUMBIA-SUICIDE SEVERITY RATING SCALE - C-SSRS
6. HAVE YOU EVER DONE ANYTHING, STARTED TO DO ANYTHING, OR PREPARED TO DO ANYTHING TO END YOUR LIFE?: NO
2. HAVE YOU ACTUALLY HAD ANY THOUGHTS OF KILLING YOURSELF?: NO
1. IN THE PAST MONTH, HAVE YOU WISHED YOU WERE DEAD OR WISHED YOU COULD GO TO SLEEP AND NOT WAKE UP?: NO

## 2025-01-09 ASSESSMENT — PATIENT HEALTH QUESTIONNAIRE - PHQ9
2. FEELING DOWN, DEPRESSED OR HOPELESS: NOT AT ALL
1. LITTLE INTEREST OR PLEASURE IN DOING THINGS: NOT AT ALL
SUM OF ALL RESPONSES TO PHQ9 QUESTIONS 1 AND 2: 0

## 2025-01-09 ASSESSMENT — ENCOUNTER SYMPTOMS
LOSS OF SENSATION IN FEET: 0
OCCASIONAL FEELINGS OF UNSTEADINESS: 0
DEPRESSION: 0

## 2025-01-09 ASSESSMENT — PAIN DESCRIPTION - DESCRIPTORS: DESCRIPTORS: ACHING

## 2025-01-09 ASSESSMENT — PAIN SCALES - GENERAL: PAINLEVEL_OUTOF10: 5 - MODERATE PAIN

## 2025-01-09 ASSESSMENT — PAIN - FUNCTIONAL ASSESSMENT: PAIN_FUNCTIONAL_ASSESSMENT: 0-10

## 2025-01-09 NOTE — PROGRESS NOTES
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  He has seen the s spine urgeon in the past and no surgery at that time.  He has not seen consult lately he wants to avoid surgery because that is going to need extensive fusion and will limit ROM he is waiting for now.  In the past we had weaned him off of opioid therapy and is only taking muscle relaxers  He has a pain deep achy stabbing in the back of the neck and upper thoracic area that is radiating to the upper limb.  This is associated with muscle spasms he also have burning and tingling down to the upper limb.  No difficulty with his gait no bowel or bladder incontinence no sexual dysfunction.    In the past he has asked about epidural steroid injection however given the degree of narrowing I thought that would be risky which might subject the cord to acute pressure during the injection which may lead to complication and we   better avoid that    Is only taking over-the-counter nonsteroidal on as-needed basis we will give him Zanaflex for the muscle spasm which is helping with the spasm and with the burning    The pain meds are helping control the pain and improving Activities of Daily living and quality of life and quality of sleep.     Working on his weight and now lost 20 lbs and he continues to try losing the weight  Modalities tried injection, surgery, physical therapy, TENS unit, nonsteroidal anti-inflammatory medication       Review of Systems :  Denied any fever or chills. No weight loss and no night sweats. No cough or sputum production. No diarrhea   The constipation has been responding to fibers and over the counter medications.     No bladder and bowel incontinence and no other changes in bladder and bowel. No skin changes.  Reports tiredness and fatigability only  "if the pain is not controlled.     Denied opioids diversion and abuse and denies alcoholism. Denies overuse of the pain medications.  No reported euphoria sensation or getting a \"high\" on the pain medications.         Physical examination  Awake, alert and oriented for time place and persons   My nurse  Sheldon GALEANO LPN   was present during the entire history and physical examination      Spasm in the back of the neck and upper thoracic area.  Cervical root tension sign increased pain in the neck.  Decreased sensory to light touch on the lateral aspect of the upper limb.  Plantar cutaneous are downgoing no long track sign.    Diagnosis  Problem List Items Addressed This Visit       Disc displacement, cervical - Primary    Relevant Medications    tiZANidine (Zanaflex) 4 mg tablet    Left cervical radiculopathy    Right cervical radiculopathy    Chronic bilateral low back pain without sciatica    Relevant Medications    tiZANidine (Zanaflex) 4 mg tablet    Lumbar degenerative disc disease    Relevant Medications    tiZANidine (Zanaflex) 4 mg tablet        Plan  Reviewed the pain generators.  Went over the types of pain with neuropathic and nociceptive and different pathologies and therapeutic modalities. Discussed the mechanism of action of interventions from acupuncture, physical therapy , regular exercises, injections, botox, spinal cord stimulation, and role of surgery     Went over pathology of the intervertebral disc displacement and the anatomical relation to the Nerve roots and relation to the radicular symptoms. Went over treatment modalities with conservative treatment including acupuncture   and epidural steroid injection with fluoroscopy guidance and last resort of surgery     I discussed the signs of myelopathy and spinal cord compression which may be chronic and slowly progressive as well as acute signs.  We will keep an eye on those.  I discussed with him that he have no signs clinically for myelopathy at " this time the central cutaneous are downgoing no hyperreflexia in the lower limbs no bowel or bladder dysfunction.  However if you start feeling that he is stumbling for no reason but he feels that he have difficulty picking up his toes and he feels that he is slipping more often we need to know about that immediately.  I offered him an MRI to check on the status of his cervical stenosis but he wanted to think about it.  I agreed with him because he has no clinical symptoms at this time however I explained to him that sometimes the clinical symptoms lags behind compression of the spinal cord.  He is going to think about it and let me know    Discussed about NSAIDS and I explained about the opioids sparing effect to allow keeping the opioids dose at minimal effective dose.   I went over the potential side effects of the NSAIDS on the gastrointestinal, renal and cardiovascular systems.           Given the opioids therapy , we discussed about the risk for accidental over dose on the pain medications, either for patient or other household. I went over the mechanism of action and mode of use of the Naloxone according to the  recommendations. I will provide a prescription for a kit.     Follow-up 8 weeks or earlier if needed     The level of clinical decision making in this office visit,  is high, given the high risks of complications with the morbidity and mortality due to the fact that acute and chronic pain may pose a threat to life and bodily function, if under treated, poorly treated, or with failure to maintain adequate treatment and timely medical follow up. Additionally over treatment has its own set of complications including overdosing on the pain medications and also the habit forming potentials with the use of the medications used to treat chronic painful conditions including therapeutic classes classified as dangerous medications. Given the serious and fluctuating nature of pain level and  instensity with extensive consideration for whenever pain changes, there is always the risk of prolonged functional impairment requiring close patient monitoring with regular assessments and reassessments and high level medical decision making at every office visit. The amount and complexity of data reviewed is high given the patient clinical presentation, labs,  data, radiology reports, and other tests as discussed during office visits. Pertinent data whether positive or negative were taken in consideration in the process of making this high level medical decision.

## 2025-03-27 ENCOUNTER — APPOINTMENT (OUTPATIENT)
Dept: PAIN MEDICINE | Facility: CLINIC | Age: 65
End: 2025-03-27
Payer: COMMERCIAL

## 2025-04-15 ENCOUNTER — APPOINTMENT (OUTPATIENT)
Dept: PAIN MEDICINE | Facility: CLINIC | Age: 65
End: 2025-04-15
Payer: COMMERCIAL

## 2025-04-23 ENCOUNTER — LAB REQUISITION (OUTPATIENT)
Dept: LAB | Facility: HOSPITAL | Age: 65
End: 2025-04-23
Payer: COMMERCIAL

## 2025-04-23 LAB — AMMONIA PLAS-SCNC: 19 UMOL/L (ref 16–53)

## 2025-04-23 PROCEDURE — 82140 ASSAY OF AMMONIA: CPT | Mod: OUT | Performed by: INTERNAL MEDICINE

## 2025-08-12 ENCOUNTER — APPOINTMENT (OUTPATIENT)
Dept: PAIN MEDICINE | Facility: CLINIC | Age: 65
End: 2025-08-12
Payer: COMMERCIAL

## 2025-08-12 VITALS
HEART RATE: 90 BPM | SYSTOLIC BLOOD PRESSURE: 147 MMHG | BODY MASS INDEX: 34.95 KG/M2 | WEIGHT: 287 LBS | OXYGEN SATURATION: 95 % | DIASTOLIC BLOOD PRESSURE: 80 MMHG | HEIGHT: 76 IN

## 2025-08-12 DIAGNOSIS — M54.12 RIGHT CERVICAL RADICULOPATHY: Primary | ICD-10-CM

## 2025-08-12 DIAGNOSIS — M50.20 DISC DISPLACEMENT, CERVICAL: ICD-10-CM

## 2025-08-12 DIAGNOSIS — M51.369 LUMBAR DEGENERATIVE DISC DISEASE: ICD-10-CM

## 2025-08-12 DIAGNOSIS — G89.29 CHRONIC BILATERAL LOW BACK PAIN WITHOUT SCIATICA: ICD-10-CM

## 2025-08-12 DIAGNOSIS — M54.50 CHRONIC BILATERAL LOW BACK PAIN WITHOUT SCIATICA: ICD-10-CM

## 2025-08-12 DIAGNOSIS — M54.12 LEFT CERVICAL RADICULOPATHY: ICD-10-CM

## 2025-08-12 PROCEDURE — 3008F BODY MASS INDEX DOCD: CPT | Performed by: PHYSICAL MEDICINE & REHABILITATION

## 2025-08-12 PROCEDURE — 99214 OFFICE O/P EST MOD 30 MIN: CPT | Performed by: PHYSICAL MEDICINE & REHABILITATION

## 2025-08-12 PROCEDURE — G2211 COMPLEX E/M VISIT ADD ON: HCPCS | Performed by: PHYSICAL MEDICINE & REHABILITATION

## 2025-08-12 RX ORDER — TIZANIDINE 4 MG/1
4 TABLET ORAL 2 TIMES DAILY
Qty: 60 TABLET | Refills: 2 | Status: SHIPPED | OUTPATIENT
Start: 2025-08-12 | End: 2025-09-11

## 2025-11-06 ENCOUNTER — APPOINTMENT (OUTPATIENT)
Dept: PAIN MEDICINE | Facility: CLINIC | Age: 65
End: 2025-11-06
Payer: MEDICARE